# Patient Record
Sex: FEMALE | Race: WHITE | NOT HISPANIC OR LATINO | Employment: OTHER | URBAN - METROPOLITAN AREA
[De-identification: names, ages, dates, MRNs, and addresses within clinical notes are randomized per-mention and may not be internally consistent; named-entity substitution may affect disease eponyms.]

---

## 2019-08-23 ENCOUNTER — OFFICE VISIT (OUTPATIENT)
Dept: CARDIOLOGY | Facility: MEDICAL CENTER | Age: 64
End: 2019-08-23
Payer: MEDICARE

## 2019-08-23 ENCOUNTER — TELEPHONE (OUTPATIENT)
Dept: CARDIOLOGY | Facility: MEDICAL CENTER | Age: 64
End: 2019-08-23

## 2019-08-23 ENCOUNTER — NON-PROVIDER VISIT (OUTPATIENT)
Dept: CARDIOLOGY | Facility: MEDICAL CENTER | Age: 64
End: 2019-08-23
Payer: MEDICARE

## 2019-08-23 VITALS
WEIGHT: 151.79 LBS | HEIGHT: 63 IN | BODY MASS INDEX: 26.89 KG/M2 | DIASTOLIC BLOOD PRESSURE: 60 MMHG | SYSTOLIC BLOOD PRESSURE: 110 MMHG | HEART RATE: 78 BPM | OXYGEN SATURATION: 98 %

## 2019-08-23 DIAGNOSIS — R00.2 PALPITATIONS: ICD-10-CM

## 2019-08-23 DIAGNOSIS — R06.02 SOB (SHORTNESS OF BREATH): ICD-10-CM

## 2019-08-23 DIAGNOSIS — R40.0 DAYTIME SOMNOLENCE: ICD-10-CM

## 2019-08-23 DIAGNOSIS — R00.2 PALPITATION: ICD-10-CM

## 2019-08-23 DIAGNOSIS — I47.19 ATRIAL TACHYCARDIA: ICD-10-CM

## 2019-08-23 LAB — EKG IMPRESSION: NORMAL

## 2019-08-23 PROCEDURE — 99204 OFFICE O/P NEW MOD 45 MIN: CPT | Performed by: INTERNAL MEDICINE

## 2019-08-23 PROCEDURE — 93000 ELECTROCARDIOGRAM COMPLETE: CPT | Performed by: INTERNAL MEDICINE

## 2019-08-23 RX ORDER — ATORVASTATIN CALCIUM 10 MG/1
TABLET, FILM COATED ORAL
COMMUNITY
Start: 2019-07-16

## 2019-08-23 RX ORDER — POLYETHYLENE GLYCOL-3350 AND ELECTROLYTES 236; 6.74; 5.86; 2.97; 22.74 G/274.31G; G/274.31G; G/274.31G; G/274.31G; G/274.31G
POWDER, FOR SOLUTION ORAL
COMMUNITY
Start: 2019-07-22 | End: 2019-08-23

## 2019-08-23 NOTE — PROGRESS NOTES
CARDIOLOGY NEW PATIENT CONSULTATION    PCP: Karishma De Souza M.D.    1. Palpitation  Nocturnal palpitations occurring sporadically but at least every few weeks.  These may be a primary cardiac arrhythmia or may be secondary to sleep disorder.  -ZIO Patch ordered    2. SOB (shortness of breath) and fatigue with exertion.  Severely limiting exercise capacity for the past several years, and stable over that time.  Symptoms may be an anginal equivalent alternatively they may relate to sleep disorder  -I ordered a echocardiogram and stress echo    3. Daytime somnolence  She is sleepy during the daytime but spends a good amount of time in bed each night.  She does not feel restored.  She is not known to be a snorer.  Nocturnal palpitations may be an additional sign of sleep apnea  -Polysomnogram ordered    Follow up with Pedro Castro M.D. in 6 weeks -she plans on leaving for [place] in late October    Chief Complaint   Patient presents with   • Palpitations       History: Rosita Youngblood is a 64 y.o. female with history of hyperlipidemia presenting for evaluation of shortness of breath and fatigue with exertion as well as nocturnal palpitations.  Every few weeks she reports episodes of nocturnal palpitations which awaken her from sleep and last several minutes at a time.  In the past she had taken atenolol for daytime palpitations which only bother her on rare occasions now.  Her other main complaint is profound fatigue and shortness of breath with exertion.  She does not experience angina while exerting herself.  She also feels very sleepy and exhausted throughout the day.  She spends 8 hours at night in bed, but has frequent nocturnal awakenings.  She needs naps regularly    ROS:   All other systems reviewed and negative except as per the HPI    History reviewed. No pertinent past medical history.  History reviewed. No pertinent surgical history.  No Known Allergies  Outpatient Encounter Medications as of  "8/23/2019   Medication Sig Dispense Refill   • atorvastatin (LIPITOR) 10 MG Tab      • [DISCONTINUED] GAVILYTE-G 236 g Recon Soln        No facility-administered encounter medications on file as of 8/23/2019.        Family History   Problem Relation Age of Onset   • Heart Disease Mother         CAD   • Heart Disease Father         CAD     Social History     Socioeconomic History   • Marital status:      Spouse name: Not on file   • Number of children: Not on file   • Years of education: Not on file   • Highest education level: Not on file   Occupational History   • Not on file   Social Needs   • Financial resource strain: Not on file   • Food insecurity:     Worry: Not on file     Inability: Not on file   • Transportation needs:     Medical: Not on file     Non-medical: Not on file   Tobacco Use   • Smoking status: Former Smoker     Start date: 2013   • Smokeless tobacco: Never Used   Substance and Sexual Activity   • Alcohol use: Yes     Comment: Rarely   • Drug use: Never   • Sexual activity: Not on file   Lifestyle   • Physical activity:     Days per week: Not on file     Minutes per session: Not on file   • Stress: Not on file   Relationships   • Social connections:     Talks on phone: Not on file     Gets together: Not on file     Attends [employer] service: Not on file     Active member of club or organization: Not on file     Attends meetings of clubs or organizations: Not on file     Relationship status: Not on file   • Intimate partner violence:     Fear of current or ex partner: Not on file     Emotionally abused: Not on file     Physically abused: Not on file     Forced sexual activity: Not on file   Other Topics Concern   • Not on file   Social History Narrative   • Not on file       PE:  /60 (BP Location: Left arm, Patient Position: Sitting, BP Cuff Size: Adult)   Pulse 78   Ht 1.6 m (5' 3\")   Wt 68.9 kg (151 lb 12.6 oz)   SpO2 98%   BMI 26.89 kg/m²   GEN: Well appearing  HEENT: " Symmetric face. Anicteric sclerae. Moist mucus membranes  NECK: No JVD. No lymphadenopathy  CARDIAC: Normal PMI,  regular, normal S1, S2.   VASCULATURE: Normal carotid amplitude without bruit.   RESP: Clear to auscultation bilaterally  ABD: Soft, non-tender, non-distended  EXT: No  edema, no clubbing or cyanosis  SKIN: Warm and dry  NEURO: No gross deficits   PSYCH: Appropriate affect, participates in conversation    Studies  No results found for: CHOLSTRLTOT, LDL, HDL, TRIGLYCERIDE    No results found for: SODIUM, POTASSIUM, CHLORIDE, CO2, GLUCOSE, BUN, CREATININE, BUNCREATRAT, GLOMRATE  No results found for: ALKPHOSPHAT, ASTSGOT, ALTSGPT, TBILIRUBIN     For this encounter I directly reviewed ECG tracings showing an early R to S transition

## 2019-09-05 ENCOUNTER — HOSPITAL ENCOUNTER (OUTPATIENT)
Dept: CARDIOLOGY | Facility: MEDICAL CENTER | Age: 64
End: 2019-09-05
Attending: INTERNAL MEDICINE
Payer: MEDICARE

## 2019-09-05 DIAGNOSIS — R06.02 SOB (SHORTNESS OF BREATH): ICD-10-CM

## 2019-09-05 DIAGNOSIS — R00.2 PALPITATION: ICD-10-CM

## 2019-09-05 DIAGNOSIS — R40.0 DAYTIME SOMNOLENCE: ICD-10-CM

## 2019-09-05 LAB
LV EJECT FRACT  99904: 65
LV EJECT FRACT  99904: 75
LV EJECT FRACT MOD 2C 99903: 47.55
LV EJECT FRACT MOD 4C 99902: 60.33
LV EJECT FRACT MOD BP 99901: 55.16

## 2019-09-05 PROCEDURE — 93306 TTE W/DOPPLER COMPLETE: CPT | Mod: 26,59 | Performed by: INTERNAL MEDICINE

## 2019-09-05 PROCEDURE — 93017 CV STRESS TEST TRACING ONLY: CPT

## 2019-09-05 PROCEDURE — 700117 HCHG RX CONTRAST REV CODE 255: Performed by: INTERNAL MEDICINE

## 2019-09-05 PROCEDURE — 93018 CV STRESS TEST I&R ONLY: CPT | Performed by: INTERNAL MEDICINE

## 2019-09-05 PROCEDURE — 93350 STRESS TTE ONLY: CPT | Mod: 26 | Performed by: INTERNAL MEDICINE

## 2019-09-05 PROCEDURE — 93306 TTE W/DOPPLER COMPLETE: CPT

## 2019-09-05 RX ADMIN — HUMAN ALBUMIN MICROSPHERES AND PERFLUTREN 3 ML: 10; .22 INJECTION, SOLUTION INTRAVENOUS at 15:57

## 2019-09-09 ENCOUNTER — TELEPHONE (OUTPATIENT)
Dept: CARDIOLOGY | Facility: MEDICAL CENTER | Age: 64
End: 2019-09-09

## 2019-09-09 NOTE — TELEPHONE ENCOUNTER
Pedro Castro M.D.  Sanjuana Koo R.N.             Echo and stress echo both unremarkable- overall reassuring for the absence of significant cardiac disease        Attempted to call pt, no answer, LM for her to call back. [employer] message sent.

## 2019-09-10 PROCEDURE — 0298T PR EXT ECG > 48HR TO 21 DAY REVIEW AND INTERPRETATN: CPT | Performed by: INTERNAL MEDICINE

## 2019-09-10 PROCEDURE — 0296T PR EXT ECG > 48HR TO 21 DAY RCRD W/CONECT INTL RCRD: CPT | Performed by: INTERNAL MEDICINE

## 2019-09-16 ENCOUNTER — TELEPHONE (OUTPATIENT)
Dept: CARDIOLOGY | Facility: MEDICAL CENTER | Age: 64
End: 2019-09-16

## 2019-09-16 DIAGNOSIS — R00.2 PALPITATION: ICD-10-CM

## 2019-09-16 DIAGNOSIS — I47.19 ATRIAL TACHYCARDIA: ICD-10-CM

## 2019-09-16 RX ORDER — METOPROLOL SUCCINATE 25 MG/1
25 TABLET, EXTENDED RELEASE ORAL DAILY
Qty: 90 TAB | Refills: 3 | Status: SHIPPED | OUTPATIENT
Start: 2019-09-16

## 2019-09-16 NOTE — TELEPHONE ENCOUNTER
Holter monitor demonstrating symptomatic episodes corresponded brief runs of atrial tachycardia.  These may be responsive to Toprol-XL 25 mg daily; which she may wish to start before our follow-up visit-if not we can discuss at that time.

## 2019-09-16 NOTE — TELEPHONE ENCOUNTER
Called pt and discussed results of Holter and recommendations of starting Toprol. She is in agreement to starting before 9/26 appt. Rx sent to [facility] in [place] per her request. Advised her to monitor HR, BP and symptoms such as dizziness or lightheadedness and to notify us if she has issues. Transferred to scheduling because pt would like to be added to cancellation list on 9/26 for appts later in the day.

## 2019-09-20 NOTE — TELEPHONE ENCOUNTER
Pt is calling with update, states blood pressure is still staying under the 90's. Please call pt to advise at [phone].

## 2019-09-20 NOTE — TELEPHONE ENCOUNTER
Pt explains her BP has been mid to high 80's/60 and HR 70's. she admits to fatigue and mildy lightheaded.     Pt is taking Toprol 25mg in the morning. She drinks about 34oz of water daily. Recommended that pt increase her water intake, amount discussed, and try taking the Toprol in the evening instead of morning.  If BP still low pt can decrease to half a tab at night until next appt.   Pt agrees to try this until her follow up appt next week with Dr. Castro on 9/26.

## 2019-09-26 ENCOUNTER — OFFICE VISIT (OUTPATIENT)
Dept: CARDIOLOGY | Facility: MEDICAL CENTER | Age: 64
End: 2019-09-26
Payer: MEDICARE

## 2019-09-26 VITALS
HEIGHT: 63 IN | OXYGEN SATURATION: 95 % | HEART RATE: 72 BPM | DIASTOLIC BLOOD PRESSURE: 52 MMHG | WEIGHT: 151 LBS | BODY MASS INDEX: 26.75 KG/M2 | SYSTOLIC BLOOD PRESSURE: 100 MMHG

## 2019-09-26 DIAGNOSIS — R40.0 DAYTIME SOMNOLENCE: ICD-10-CM

## 2019-09-26 DIAGNOSIS — R00.2 PALPITATIONS: ICD-10-CM

## 2019-09-26 PROCEDURE — 99213 OFFICE O/P EST LOW 20 MIN: CPT | Performed by: INTERNAL MEDICINE

## 2019-09-26 RX ORDER — LIOTHYRONINE SODIUM 5 UG/1
10 TABLET ORAL DAILY
COMMUNITY
Start: 2019-09-20

## 2019-09-26 NOTE — LETTER
[facility]   [address] [place] [address] [address]  [place] [address]  Phone: [phone]  Fax: [phone]              Rosita Youngblood  1955    Encounter Date: 9/26/2019    Pedro Castro M.D.          PROGRESS NOTE:  CARDIOLOGY OUTPATIENT FOLLOWUP    PCP: Karishma De Souza M.D.    1. Palpitations  Corresponding to brief episodes of AT/SVT. Possibly related to underlying thyroid disturbance.   - Metoprolol changed to 12.5 mg daily    She wishes to continue with beta blockade but is bothered by fatigue and lower BP. I explained that no treatment is a reasonable option; given the infrequency of episodes.     2. Daytime somnolence  I again discussed the possiblity of sleep disorder and offered to arrange a PSG. [place] and will  the discussion with primary care when she returns.       Follow up with Pedro Castro M.D. as needed       Chief Complaint   Patient presents with   • Palpitations       History: Rosita Youngblood is a 64 y.o. female with a past medical history of Palpitations and thyroid disorder presenting for follow up of cardiac testing and initiation of metoprolol.  Since I last evaluated her she underwent echocardiogram as well as stress echocardiogram which demonstrated no sign of important cardiovascular disease.  She did wear a 14-day ZIO patch monitor and experienced palpitations one time during this recording which corresponded to a short episode of atrial tachycardia/PSVT.  She was offered beta-blockade for symptom management, which to my surprise she wished to take despite the infrequency of symptoms.  She now reports side effects from metoprolol including fatigue and low blood pressure.  It is unclear to me if she has bothered more by low blood pressure recordings or fatigue; notably she did have exceptional fatigue which she reported during our last office visit-prior to initiation of beta-blockers.    ROS:   All  "other systems reviewed and negative except as per the HPI    PE:  /52 (BP Location: Left arm, Patient Position: Sitting, BP Cuff Size: Adult)   Pulse 72   Ht 1.6 m (5' 3\")   Wt 68.5 kg (151 lb)   SpO2 95%   BMI 26.75 kg/m²    Gen: Well appearing  HEENT: Symmetric face. Anicteric sclerae. Moist mucus membranes  NECK: No JVD. No lymphadenopathy  CARDIAC: Normal PMI, regular, normal S1, S2.    VASCULATURE: Normal carotid amplitude without bruit.   RESP: Clear to auscultation bilaterally  ABD: Soft, non-tender, non-distended  EXT: No edema, no clubbing or cyanosis  SKIN: Warm and dry  NEURO: No gross deficits   PSYCH: Appropriate affect, participates in conversation    History reviewed. No pertinent past medical history.  No Known Allergies  Outpatient Encounter Medications as of 9/26/2019   Medication Sig Dispense Refill   • liothyronine (CYTOMEL) 5 MCG Tab Take 10 mcg by mouth every day.     • metoprolol SR (TOPROL XL) 25 MG TABLET SR 24 HR Take 1 Tab by mouth every day. 90 Tab 3   • atorvastatin (LIPITOR) 10 MG Tab        No facility-administered encounter medications on file as of 9/26/2019.      Social History     Socioeconomic History   • Marital status:      Spouse name: Not on file   • Number of children: Not on file   • Years of education: Not on file   • Highest education level: Not on file   Occupational History   • Not on file   Social Needs   • Financial resource strain: Not on file   • Food insecurity:     Worry: Not on file     Inability: Not on file   • Transportation needs:     Medical: Not on file     Non-medical: Not on file   Tobacco Use   • Smoking status: Former Smoker     Start date: 2013   • Smokeless tobacco: Never Used   Substance and Sexual Activity   • Alcohol use: Yes     Comment: Rarely   • Drug use: Never   • Sexual activity: Not on file   Lifestyle   • Physical activity:     Days per week: Not on file     Minutes per session: Not on file   • Stress: Not on file   "   Relationships   • Social connections:     Talks on phone: Not on file     Gets together: Not on file     Attends [employer] service: Not on file     Active member of club or organization: Not on file     Attends meetings of clubs or organizations: Not on file     Relationship status: Not on file   • Intimate partner violence:     Fear of current or ex partner: Not on file     Emotionally abused: Not on file     Physically abused: Not on file     Forced sexual activity: Not on file   Other Topics Concern   • Not on file   Social History Narrative   • Not on file       Studies  No results found for: CHOLSTRLTOT, LDL, HDL, TRIGLYCERIDE    No results found for: SODIUM, POTASSIUM, CHLORIDE, CO2, GLUCOSE, BUN, CREATININE, BUNCREATRAT, GLOMRATE  No results found for: ALKPHOSPHAT, ASTSGOT, ALTSGPT, TBILIRUBIN     For this encounter I directly reviewed ECG tracings, holter/event monitor tracings, Echo results and stress test results I agree with the interpretations in the electronic health record          Yrn Rodriguez M.D.  [id number]  [place] [id number]  VIA Facsimile: [phone]

## 2019-09-26 NOTE — PROGRESS NOTES
"CARDIOLOGY OUTPATIENT FOLLOWUP    PCP: Karishma De Souza M.D.    1. Palpitations  Corresponding to brief episodes of AT/SVT. Possibly related to underlying thyroid disturbance.   - Metoprolol changed to 12.5 mg daily    She wishes to continue with beta blockade but is bothered by fatigue and lower BP. I explained that no treatment is a reasonable option; given the infrequency of episodes.     2. Daytime somnolence  I again discussed the possiblity of sleep disorder and offered to arrange a PSG. [place] is [place] and will  the discussion with primary care when she returns.       Follow up with Pedro Castro M.D. as needed       Chief Complaint   Patient presents with   • Palpitations       History: Rosita Youngblood is a 64 y.o. female with a past medical history of Palpitations and thyroid disorder presenting for follow up of cardiac testing and initiation of metoprolol.  Since I last evaluated her she underwent echocardiogram as well as stress echocardiogram which demonstrated no sign of important cardiovascular disease.  She did wear a 14-day ZIO patch monitor and experienced palpitations one time during this recording which corresponded to a short episode of atrial tachycardia/PSVT.  She was offered beta-blockade for symptom management, which to my surprise she wished to take despite the infrequency of symptoms.  She now reports side effects from metoprolol including fatigue and low blood pressure.  It is unclear to me if she has bothered more by low blood pressure recordings or fatigue; notably she did have exceptional fatigue which she reported during our last office visit-prior to initiation of beta-blockers.    ROS:   All other systems reviewed and negative except as per the HPI    PE:  /52 (BP Location: Left arm, Patient Position: Sitting, BP Cuff Size: Adult)   Pulse 72   Ht 1.6 m (5' 3\")   Wt 68.5 kg (151 lb)   SpO2 95%   BMI 26.75 kg/m²   Gen: Well appearing  HEENT: " Symmetric face. Anicteric sclerae. Moist mucus membranes  NECK: No JVD. No lymphadenopathy  CARDIAC: Normal PMI, regular, normal S1, S2.    VASCULATURE: Normal carotid amplitude without bruit.   RESP: Clear to auscultation bilaterally  ABD: Soft, non-tender, non-distended  EXT: No edema, no clubbing or cyanosis  SKIN: Warm and dry  NEURO: No gross deficits   PSYCH: Appropriate affect, participates in conversation    History reviewed. No pertinent past medical history.  No Known Allergies  Outpatient Encounter Medications as of 9/26/2019   Medication Sig Dispense Refill   • liothyronine (CYTOMEL) 5 MCG Tab Take 10 mcg by mouth every day.     • metoprolol SR (TOPROL XL) 25 MG TABLET SR 24 HR Take 1 Tab by mouth every day. 90 Tab 3   • atorvastatin (LIPITOR) 10 MG Tab        No facility-administered encounter medications on file as of 9/26/2019.      Social History     Socioeconomic History   • Marital status:      Spouse name: Not on file   • Number of children: Not on file   • Years of education: Not on file   • Highest education level: Not on file   Occupational History   • Not on file   Social Needs   • Financial resource strain: Not on file   • Food insecurity:     Worry: Not on file     Inability: Not on file   • Transportation needs:     Medical: Not on file     Non-medical: Not on file   Tobacco Use   • Smoking status: Former Smoker     Start date: 2013   • Smokeless tobacco: Never Used   Substance and Sexual Activity   • Alcohol use: Yes     Comment: Rarely   • Drug use: Never   • Sexual activity: Not on file   Lifestyle   • Physical activity:     Days per week: Not on file     Minutes per session: Not on file   • Stress: Not on file   Relationships   • Social connections:     Talks on phone: Not on file     Gets together: Not on file     Attends [employer] service: Not on file     Active member of club or organization: Not on file     Attends meetings of clubs or organizations: Not on file      Relationship status: Not on file   • Intimate partner violence:     Fear of current or ex partner: Not on file     Emotionally abused: Not on file     Physically abused: Not on file     Forced sexual activity: Not on file   Other Topics Concern   • Not on file   Social History Narrative   • Not on file       Studies  No results found for: CHOLSTRLTOT, LDL, HDL, TRIGLYCERIDE    No results found for: SODIUM, POTASSIUM, CHLORIDE, CO2, GLUCOSE, BUN, CREATININE, BUNCREATRAT, GLOMRATE  No results found for: ALKPHOSPHAT, ASTSGOT, ALTSGPT, TBILIRUBIN     For this encounter I directly reviewed ECG tracings, holter/event monitor tracings, Echo results and stress test results I agree with the interpretations in the electronic health record